# Patient Record
Sex: FEMALE | Race: WHITE | NOT HISPANIC OR LATINO | URBAN - METROPOLITAN AREA
[De-identification: names, ages, dates, MRNs, and addresses within clinical notes are randomized per-mention and may not be internally consistent; named-entity substitution may affect disease eponyms.]

---

## 2019-10-15 ENCOUNTER — OUTPATIENT (OUTPATIENT)
Dept: OUTPATIENT SERVICES | Facility: HOSPITAL | Age: 44
LOS: 1 days | End: 2019-10-15
Payer: COMMERCIAL

## 2019-10-15 VITALS
HEART RATE: 59 BPM | DIASTOLIC BLOOD PRESSURE: 74 MMHG | RESPIRATION RATE: 16 BRPM | SYSTOLIC BLOOD PRESSURE: 108 MMHG | WEIGHT: 136.69 LBS | TEMPERATURE: 98 F | HEIGHT: 61 IN

## 2019-10-15 DIAGNOSIS — D25.2 SUBSEROSAL LEIOMYOMA OF UTERUS: ICD-10-CM

## 2019-10-15 DIAGNOSIS — Z98.891 HISTORY OF UTERINE SCAR FROM PREVIOUS SURGERY: Chronic | ICD-10-CM

## 2019-10-15 DIAGNOSIS — Z98.890 OTHER SPECIFIED POSTPROCEDURAL STATES: Chronic | ICD-10-CM

## 2019-10-15 DIAGNOSIS — Z29.8 ENCOUNTER FOR OTHER SPECIFIED PROPHYLACTIC MEASURES: ICD-10-CM

## 2019-10-15 DIAGNOSIS — Z01.818 ENCOUNTER FOR OTHER PREPROCEDURAL EXAMINATION: ICD-10-CM

## 2019-10-15 LAB
ANION GAP SERPL CALC-SCNC: 13 MMOL/L — SIGNIFICANT CHANGE UP (ref 5–17)
APPEARANCE UR: CLEAR — SIGNIFICANT CHANGE UP
BASOPHILS # BLD AUTO: 0.07 K/UL — SIGNIFICANT CHANGE UP (ref 0–0.2)
BASOPHILS NFR BLD AUTO: 1.3 % — SIGNIFICANT CHANGE UP (ref 0–2)
BILIRUB UR-MCNC: NEGATIVE — SIGNIFICANT CHANGE UP
BLD GP AB SCN SERPL QL: SIGNIFICANT CHANGE UP
BUN SERPL-MCNC: 8 MG/DL — SIGNIFICANT CHANGE UP (ref 8–20)
CALCIUM SERPL-MCNC: 8.4 MG/DL — LOW (ref 8.6–10.2)
CHLORIDE SERPL-SCNC: 104 MMOL/L — SIGNIFICANT CHANGE UP (ref 98–107)
CO2 SERPL-SCNC: 21 MMOL/L — LOW (ref 22–29)
COLOR SPEC: YELLOW — SIGNIFICANT CHANGE UP
COMMENT - BLOOD BANK: SIGNIFICANT CHANGE UP
CREAT SERPL-MCNC: 0.5 MG/DL — SIGNIFICANT CHANGE UP (ref 0.5–1.3)
DIFF PNL FLD: NEGATIVE — SIGNIFICANT CHANGE UP
EOSINOPHIL # BLD AUTO: 0.15 K/UL — SIGNIFICANT CHANGE UP (ref 0–0.5)
EOSINOPHIL NFR BLD AUTO: 2.8 % — SIGNIFICANT CHANGE UP (ref 0–6)
GLUCOSE SERPL-MCNC: 86 MG/DL — SIGNIFICANT CHANGE UP (ref 70–115)
GLUCOSE UR QL: NEGATIVE MG/DL — SIGNIFICANT CHANGE UP
HBA1C BLD-MCNC: 5 % — SIGNIFICANT CHANGE UP (ref 4–5.6)
HCT VFR BLD CALC: 35.5 % — SIGNIFICANT CHANGE UP (ref 34.5–45)
HGB BLD-MCNC: 10.8 G/DL — LOW (ref 11.5–15.5)
IMM GRANULOCYTES NFR BLD AUTO: 0.4 % — SIGNIFICANT CHANGE UP (ref 0–1.5)
KETONES UR-MCNC: NEGATIVE — SIGNIFICANT CHANGE UP
LEUKOCYTE ESTERASE UR-ACNC: NEGATIVE — SIGNIFICANT CHANGE UP
LYMPHOCYTES # BLD AUTO: 1.03 K/UL — SIGNIFICANT CHANGE UP (ref 1–3.3)
LYMPHOCYTES # BLD AUTO: 19 % — SIGNIFICANT CHANGE UP (ref 13–44)
MCHC RBC-ENTMCNC: 24.9 PG — LOW (ref 27–34)
MCHC RBC-ENTMCNC: 30.4 GM/DL — LOW (ref 32–36)
MCV RBC AUTO: 81.8 FL — SIGNIFICANT CHANGE UP (ref 80–100)
MONOCYTES # BLD AUTO: 0.29 K/UL — SIGNIFICANT CHANGE UP (ref 0–0.9)
MONOCYTES NFR BLD AUTO: 5.3 % — SIGNIFICANT CHANGE UP (ref 2–14)
NEUTROPHILS # BLD AUTO: 3.87 K/UL — SIGNIFICANT CHANGE UP (ref 1.8–7.4)
NEUTROPHILS NFR BLD AUTO: 71.2 % — SIGNIFICANT CHANGE UP (ref 43–77)
NITRITE UR-MCNC: NEGATIVE — SIGNIFICANT CHANGE UP
PH UR: 6 — SIGNIFICANT CHANGE UP (ref 5–8)
PLATELET # BLD AUTO: 333 K/UL — SIGNIFICANT CHANGE UP (ref 150–400)
POTASSIUM SERPL-MCNC: 3.6 MMOL/L — SIGNIFICANT CHANGE UP (ref 3.5–5.3)
POTASSIUM SERPL-SCNC: 3.6 MMOL/L — SIGNIFICANT CHANGE UP (ref 3.5–5.3)
PROT UR-MCNC: NEGATIVE MG/DL — SIGNIFICANT CHANGE UP
RBC # BLD: 4.34 M/UL — SIGNIFICANT CHANGE UP (ref 3.8–5.2)
RBC # FLD: 16 % — HIGH (ref 10.3–14.5)
SODIUM SERPL-SCNC: 138 MMOL/L — SIGNIFICANT CHANGE UP (ref 135–145)
SP GR SPEC: 1.01 — SIGNIFICANT CHANGE UP (ref 1.01–1.02)
UROBILINOGEN FLD QL: NEGATIVE MG/DL — SIGNIFICANT CHANGE UP
WBC # BLD: 5.43 K/UL — SIGNIFICANT CHANGE UP (ref 3.8–10.5)
WBC # FLD AUTO: 5.43 K/UL — SIGNIFICANT CHANGE UP (ref 3.8–10.5)

## 2019-10-15 PROCEDURE — G0463: CPT

## 2019-10-15 NOTE — PATIENT PROFILE ADULT - NSPROPASSIVESMOKEEXPOSURE_GEN_A_NUR
_____________________________________________________  CHIEF COMPLAINT:  Chief Complaint   Patient presents with    Left Hip - Pain    Right Hip - Pain         SUBJECTIVE:  Kyler Carrillo is a 60y o  year old female who presents bilateral hip pain  Patient states the pain has been going on for some time but has noticed that it has gotten progressively worse over last few days  She denies any new injuries, falls, trauma to the area  She states it hurts to lay on her right side as well as her left side  The right hip pain is greater than the left  hip pain  She has taken some anti-inflammatories which only helps a little bit  Her pain is over aspect of both of her hips  She states the pain is constant at this point  She denies any numbness or tingling  She denies any constitutional signs or symptoms      PAST MEDICAL HISTORY:  Past Medical History:   Diagnosis Date    Anxiety     Cancer of skin of abdomen     Cardiomyopathy ([address] )     Carpal tunnel syndrome     Cervical herniated disc     Chronic migraine without aura without status migrainosus, not intractable     Chronic sinusitis     Compression fracture of lumbar spine, non-traumatic (HCC)     COPD (chronic obstructive pulmonary disease) (HCC)     Depression     Fibroids     GERD (gastroesophageal reflux disease)     Hepatitis C antibody test positive     No detectable HCV RNA    Hypercholesteremia     Hypertension     Liver hemangioma     Lumbar disc herniation     Nasal turbinate hypertrophy     Rheumatoid arthritis (HCC)     RLS (restless legs syndrome)     Tension headache        PAST SURGICAL HISTORY:  Past Surgical History:   Procedure Laterality Date    CHOLECYSTECTOMY      1/22/2016    COLONOSCOPY  11/22/2011    Dr Jane Sawyer Left 05/28/2014    FOOT SURGERY Right 09/17/2013    2nd toe    FOOT SURGERY Bilateral 11/08/2017    HAND SURGERY  05/23/2016    Right index finger and knuckle repaired    KNEE SURGERY Right 07/10/2012    LARYNGOSCOPY  05/15/2012    direct laryngoscopy with stripping of vocal cords    LARYNGOSCOPY  04/17/2012    direct laryngoscopy with stripping of vocal cords    MOHS SURGERY  02/27/2015    Trunk/Abdomen    NASAL SEPTUM SURGERY  03/05/2013    NEUROPLASTY / TRANSPOSITION MEDIAN NERVE AT CARPAL TUNNEL Right 03/09/2015    ROTATOR CUFF REPAIR Left 10/23/2012    SHOULDER ARTHROPLASTY      total shoulder replacement-right-9/22/2016    SINUS SURGERY  05/16/2017    Dr Mehdi Arment[facility] at [facility]       FAMILY HISTORY:  Family History   Problem Relation Age of Onset    Heart disease Mother         cardiac pacemaker    Lymphoma Father     Breast cancer Sister     Stroke Maternal Grandfather     Cancer Family     Other Family         bone issues       SOCIAL HISTORY:  Social History     Tobacco Use    Smoking status: Current Every Day Smoker     Packs/day: 0 50     Years: 26 00     Pack years: 13 00     Types: Cigarettes     Start date: 1992    Smokeless tobacco: Never Used   Substance Use Topics    Alcohol use: Yes     Frequency: 2-4 times a month     Comment: social    Drug use: No       MEDICATIONS:    Current Outpatient Medications:     Adalimumab (HUMIRA PEN) 40 MG/0 8ML PNKT, Inject under the skin, Disp: , Rfl:     albuterol (VENTOLIN HFA) 90 mcg/act inhaler, Inhale 2 puffs 2 (two) times a day  , Disp: , Rfl:     alendronate (FOSAMAX) 70 mg tablet, Take 70 mg by mouth Once a week, Disp: , Rfl:     aspirin 81 MG tablet, Take 1 tablet by mouth daily, Disp: , Rfl:     atorvastatin (LIPITOR) 40 mg tablet, Take 1 tablet by mouth, Disp: , Rfl:     bethanechol (URECHOLINE) 25 mg tablet, Take 1 tablet by mouth 2 (two) times a day  , Disp: , Rfl:     buPROPion (WELLBUTRIN XL) 150 mg 24 hr tablet, TAKE 1 TABLET BY MOUTH EVERY DAY, Disp: 30 tablet, Rfl: 3    butalbital-acetaminophen-caffeine (FIORICET,ESGIC) -40 mg per tablet, Take 1 tablet by mouth 2 (two) times a day as needed for migraine, Disp: 40 tablet, Rfl: 1    Calcium Carb-Cholecalciferol (CALCIUM 600+D3) 600-200 MG-UNIT TABS, Take 2 tablets by mouth daily, Disp: , Rfl:     clonazePAM (KlonoPIN) 0 5 mg tablet, Take 1 tablet by mouth as needed  , Disp: , Rfl:     DULoxetine (CYMBALTA) 30 mg delayed release capsule, Take 30 mg by mouth daily, Disp: , Rfl:     DULoxetine (CYMBALTA) 60 mg delayed release capsule, Take 60 mg by mouth daily  , Disp: , Rfl:     ergocalciferol (VITAMIN D2) 50,000 units, TAKE ONE CAPSULE BY MOUTH ONE TIME PER WEEK, Disp: , Rfl:     eszopiclone (LUNESTA) 2 mg tablet, TAKE 1 TABLET (2 MG) BY MOUTH DAILY IMMEDIATELY BEFORE BEDTIME, Disp: , Rfl: 0    fluticasone (FLONASE) 50 mcg/act nasal spray, 1 spray into each nostril daily, Disp: 16 g, Rfl: 3    Lactobacillus (PROBIOTIC ACIDOPHILUS PO), Take 1 capsule by mouth daily, Disp: , Rfl:     lidocaine (XYLOCAINE) 5 % ointment, apply locally DAILY prn, Disp: , Rfl: 2    lisinopril (ZESTRIL) 40 mg tablet, Take 1 tablet by mouth daily, Disp: , Rfl:     meclizine (ANTIVERT) 25 mg tablet, TAKE 1 TABLET (25 MG TOTAL) BY MOUTH EVERY 12 (TWELVE) HOURS AS NEEDED FOR DIZZINESS, Disp: 60 tablet, Rfl: 3    metroNIDAZOLE (METROGEL) 1 % gel, Apply topically as needed  , Disp: , Rfl:     mometasone (NASONEX) 50 mcg/act nasal spray, into each nostril, Disp: , Rfl:     orphenadrine (NORFLEX) 100 mg tablet, TAKE 1 TABLET (100 MG TOTAL) BY MOUTH DAILY AT BEDTIME FOR 30 DAYS, Disp: 30 tablet, Rfl: 1    pantoprazole (PROTONIX) 40 mg tablet, Take 1 tablet by mouth 2 (two) times a day  , Disp: , Rfl:     primidone (MYSOLINE) 50 mg tablet, TAKE 1 TABLET BY MOUTH TWICE A DAY, Disp: 180 tablet, Rfl: 1    topiramate (TOPAMAX) 50 MG tablet, Take 1 tablet (50 mg total) by mouth daily at bedtime, Disp: 30 tablet, Rfl: 6    BREO ELLIPTA 200-25 MCG/INH inhaler, , Disp: , Rfl:     meloxicam (MOBIC) 15 mg tablet, , Disp: , Rfl:     ALLERGIES:  Allergies   Allergen Reactions    Clindamycin Rash    Pollen Extract Itching and Sneezing       REVIEW OF SYSTEMS:  General: no fever, no chills  HEENT:  No loss of hearing or eyesight problems  Eyes:  No red eyes  Respiratory:  No coughing, shortness of breath or wheezing  Cardiovascular:  No chest pain, no palpitations  GI:  Abdomen soft nontender, denies nausea  Endocrine:  No muscle weakness, no frequent urination, no excessive thirst  Urinary:  No dysuria, no incontinence  Musculoskeletal: see HPI and PE  SKIN:  No skin rash, no dry skin  Neurological:  No headaches, no confusion  Psychiatric:  No suicide thoughts, no anxiety, no depression  Review of all other systems is negative    LABS:  HgA1c: No results found for: HGBA1C  BMP: No results found for: GLUCOSE, CALCIUM, NA, K, CO2, CL, BUN, CREATININE    _____________________________________________________  PHYSICAL EXAMINATION:  Vitals:    03/06/19 1044   BP: 120/76   Pulse: 75   Resp: 18       General: well developed and well nourished, alert, oriented times 3 and appears comfortable  Psychiatric: Normal  HEENT: Trachea Midline, No torticollis  Cardiovascular: No discernable arrhythmia  Pulmonary: No wheezing or stridor  Skin: No masses, erthema, lacerations, fluctation, ulcerations  Neurovascular:  L1-S1 motor and sensory intact, pulses were compared bilaterally and were equal, capillary refill less than 3 seconds  MUSCULOSKELETAL EXAMINATION:  Right hip:  No erythema edema or ecchymosis noted  Skin is warm to touch  Tenderness to palpation over the greater trochanter  Hip range of motion was within normal limits  Strength is 5/5 in all directions  Negative log roll test, negative Stinchfield test, negative straight leg raise  Patient is neurovascularly intact  Left hip: No erythema edema or ecchymosis noted  Skin is warm to touch  Tenderness to palpation over the greater trochanter  Hip range of motion was within normal limits  Strength is 5/5 in all directions    Negative log roll test, negative Stinchfield test, negative straight leg raise  Patient is neurovascularly intact     _____________________________________________________  STUDIES REVIEWED:  I personally reviewed the x-rays from 03/06/2019  X-rays demonstrated no acute fractures or dislocations  Joint space is well preserved bilateral hips  Small osteophyte appreciated over bilateral greater trochanters  ASSESSMENT/PLAN:    Diagnoses and all orders for this visit:    Greater trochanteric bursitis of right hip    Pain of both hip joints  -     Cancel: XR hips bilateral 3-4 vw w pelvis if performed; Future    Other orders  -     ergocalciferol (VITAMIN D2) 50,000 units; TAKE ONE CAPSULE BY MOUTH ONE TIME PER WEEK  -     BREO ELLIPTA 200-25 MCG/INH inhaler  -     meloxicam (MOBIC) 15 mg tablet        Assessment:   Greater trochanteric bursitis right hip    Plan:   Patient was given a cortisone injection today into the right greater trochanter  She tolerated it well  She can continue to take anti-inflammatories as needed for pain  She will follow up in 6 weeks with Dr Husam Lira to see how the injections work  If at that time she continues to have some discomfort we can start her on a course of physical therapy  She has difficulty getting to physical therapy currently due to transportation      Follow Up:  Six weeks    PROCEDURES PERFORMED:  Large joint arthrocentesis: R greater trochanteric bursa  Date/Time: 3/6/2019 11:25 AM  Consent given by: patient  Site marked: site marked  Timeout: Immediately prior to procedure a time out was called to verify the correct patient, procedure, equipment, support staff and site/side marked as required   Supporting Documentation  Indications: pain   Procedure Details  Location: hip - R greater trochanteric bursa  Needle size: 22 G  Ultrasound guidance: no  Approach: lateral  Medications administered: 2 mL bupivacaine (PF) 0 75 %; 2 mL lidocaine 1 %; 1 mL methylPREDNISolone acetate 40 mg/mL    Patient tolerance: patient tolerated the procedure well with no immediate complications  Dressing:  Sterile dressing applied            Scribe Attestation    I,:    am acting as a scribe while in the presence of the attending physician :        I,:    personally performed the services described in this documentation    as scribed in my presence : Unknown

## 2019-10-15 NOTE — H&P PST ADULT - ATTENDING COMMENTS
patient with h/o menorraghia status post 2 previous blood transfusions due to fibroid uterus  patient desires definitive management  risks, benefits and alternatives of abdominal hysterectomy explained  will proceed with planned surgery  surgical site marked and consent signed

## 2019-10-15 NOTE — H&P PST ADULT - NEGATIVE GASTROINTESTINAL SYMPTOMS
no flatulence/no diarrhea/no abdominal pain/no hematochezia/no hiccoughs/no nausea/no melena/no jaundice/no change in bowel habits/no steatorrhea/no vomiting

## 2019-10-15 NOTE — H&P PST ADULT - NSICDXPROBLEM_GEN_ALL_CORE_FT
PROBLEM DIAGNOSES  Problem: Need for malaria prophylaxis  Assessment and Plan: moderate risk for VTE event, the surgical team will evaluate for appropriate VTE prophylaxis    Problem: Leiomyoma, subserous  Assessment and Plan:  total abdominal hysterectomy bilateral salpingectomy.

## 2019-10-15 NOTE — H&P PST ADULT - HISTORY OF PRESENT ILLNESS
44 year old female  present with menorrhagia. Patient state her periods last about 10 days and are very heavy and passes large colts.  She state work up reveled a large fibroid.  She is now schedule for total abdominal hysterectomy bilateral salpingectomy.

## 2019-10-15 NOTE — H&P PST ADULT - ASSESSMENT
44 year old female  present with menorrhagia. Patient state her periods last about 10 days and are very heavy and passes large colts.  She state work up reveled a large fibroid.  She is now schedule for total abdominal hysterectomy bilateral salpingectomy.    CAPRINI VTE 2.0 SCORE [CLOT updated 2019]    AGE RELATED RISK FACTORS                                                       MOBILITY RELATED FACTORS  [ x] Age 41-60 years                                            (1 Point)                    [ ] Bed rest                                                        (1 Point)  [ ] Age: 61-74 years                                           (2 Points)                  [ ] Plaster cast                                                   (2 Points)  [ ] Age= 75 years                                              (3 Points)                    [ ] Bed bound for more than 72 hours                 (2 Points)    DISEASE RELATED RISK FACTORS                                               GENDER SPECIFIC FACTORS  [ ] Edema in the lower extremities                       (1 Point)              [ ] Pregnancy                                                     (1 Point)  [ ] Varicose veins                                               (1 Point)                     [ ] Post-partum < 6 weeks                                   (1 Point)             [x ] BMI > 25 Kg/m2                                            (1 Point)                     [ ] Hormonal therapy  or oral contraception          (1 Point)                 [ ] Sepsis (in the previous month)                        (1 Point)               [ ] History of pregnancy complications                 (1 point)  [ ] Pneumonia or serious lung disease                                               [ ] Unexplained or recurrent                     (1 Point)           (in the previous month)                               (1 Point)  [ ] Abnormal pulmonary function test                     (1 Point)                 SURGERY RELATED RISK FACTORS  [ ] Acute myocardial infarction                              (1 Point)               [ ]  Section                                             (1 Point)  [ ] Congestive heart failure (in the previous month)  (1 Point)      [ ] Minor surgery                                                  (1 Point)   [ ] Inflammatory bowel disease                             (1 Point)               [ ] Arthroscopic surgery                                        (2 Points)  [ ] Central venous access                                      (2 Points)                [ x] General surgery lasting more than 45 minutes (2 points)  [ ] Malignancy- Present or previous                   (2 Points)                [ ] Elective arthroplasty                                         (5 points)    [ ] Stroke (in the previous month)                          (5 Points)                                                                                                                                                           HEMATOLOGY RELATED FACTORS                                                 TRAUMA RELATED RISK FACTORS  [ ] Prior episodes of VTE                                     (3 Points)                [ ] Fracture of the hip, pelvis, or leg                       (5 Points)  [ ] Positive family history for VTE                         (3 Points)             [ ] Acute spinal cord injury (in the previous month)  (5 Points)  [ ] Prothrombin 60834 A                                     (3 Points)               [ ] Paralysis  (less than 1 month)                             (5 Points)  [ ] Factor V Leiden                                             (3 Points)                  [ ] Multiple Trauma within 1 month                        (5 Points)  [ ] Lupus anticoagulants                                     (3 Points)                                                           [ ] Anticardiolipin antibodies                               (3 Points)                                                       [ ] High homocysteine in the blood                      (3 Points)                                             [ ] Other congenital or acquired thrombophilia      (3 Points)                                                [ ] Heparin induced thrombocytopenia                  (3 Points)                                     Total Score [ 4   ]

## 2019-10-17 RX ORDER — SODIUM CHLORIDE 9 MG/ML
3 INJECTION INTRAMUSCULAR; INTRAVENOUS; SUBCUTANEOUS EVERY 8 HOURS
Refills: 0 | Status: DISCONTINUED | OUTPATIENT
Start: 2019-10-24 | End: 2019-10-24

## 2019-10-24 ENCOUNTER — INPATIENT (INPATIENT)
Facility: HOSPITAL | Age: 44
LOS: 1 days | Discharge: HOME | DRG: 742 | End: 2019-10-26
Attending: OBSTETRICS & GYNECOLOGY | Admitting: OBSTETRICS & GYNECOLOGY
Payer: COMMERCIAL

## 2019-10-24 VITALS
RESPIRATION RATE: 16 BRPM | WEIGHT: 138.01 LBS | DIASTOLIC BLOOD PRESSURE: 60 MMHG | HEIGHT: 61 IN | SYSTOLIC BLOOD PRESSURE: 100 MMHG | OXYGEN SATURATION: 99 % | HEART RATE: 94 BPM | TEMPERATURE: 97 F

## 2019-10-24 DIAGNOSIS — D25.2 SUBSEROSAL LEIOMYOMA OF UTERUS: ICD-10-CM

## 2019-10-24 DIAGNOSIS — Z98.891 HISTORY OF UTERINE SCAR FROM PREVIOUS SURGERY: Chronic | ICD-10-CM

## 2019-10-24 DIAGNOSIS — Z98.890 OTHER SPECIFIED POSTPROCEDURAL STATES: Chronic | ICD-10-CM

## 2019-10-24 LAB
BLD GP AB SCN SERPL QL: SIGNIFICANT CHANGE UP
GLUCOSE BLDC GLUCOMTR-MCNC: 110 MG/DL — HIGH (ref 70–99)
GLUCOSE BLDC GLUCOMTR-MCNC: 151 MG/DL — HIGH (ref 70–99)
GLUCOSE BLDC GLUCOMTR-MCNC: 91 MG/DL — SIGNIFICANT CHANGE UP (ref 70–99)

## 2019-10-24 PROCEDURE — 88300 SURGICAL PATH GROSS: CPT | Mod: 26,59

## 2019-10-24 PROCEDURE — 88307 TISSUE EXAM BY PATHOLOGIST: CPT | Mod: 26

## 2019-10-24 RX ORDER — HYDROMORPHONE HYDROCHLORIDE 2 MG/ML
30 INJECTION INTRAMUSCULAR; INTRAVENOUS; SUBCUTANEOUS
Refills: 0 | Status: DISCONTINUED | OUTPATIENT
Start: 2019-10-24 | End: 2019-10-25

## 2019-10-24 RX ORDER — CEFOTETAN DISODIUM 1 G
2 VIAL (EA) INJECTION ONCE
Refills: 0 | Status: COMPLETED | OUTPATIENT
Start: 2019-10-24 | End: 2019-10-24

## 2019-10-24 RX ORDER — ONDANSETRON 8 MG/1
4 TABLET, FILM COATED ORAL ONCE
Refills: 0 | Status: COMPLETED | OUTPATIENT
Start: 2019-10-24 | End: 2019-10-24

## 2019-10-24 RX ORDER — ONDANSETRON 8 MG/1
4 TABLET, FILM COATED ORAL ONCE
Refills: 0 | Status: DISCONTINUED | OUTPATIENT
Start: 2019-10-24 | End: 2019-10-26

## 2019-10-24 RX ORDER — FENTANYL CITRATE 50 UG/ML
50 INJECTION INTRAVENOUS
Refills: 0 | Status: DISCONTINUED | OUTPATIENT
Start: 2019-10-24 | End: 2019-10-24

## 2019-10-24 RX ORDER — SODIUM CHLORIDE 9 MG/ML
1000 INJECTION, SOLUTION INTRAVENOUS
Refills: 0 | Status: DISCONTINUED | OUTPATIENT
Start: 2019-10-24 | End: 2019-10-24

## 2019-10-24 RX ADMIN — FENTANYL CITRATE 50 MICROGRAM(S): 50 INJECTION INTRAVENOUS at 18:18

## 2019-10-24 RX ADMIN — FENTANYL CITRATE 50 MICROGRAM(S): 50 INJECTION INTRAVENOUS at 18:08

## 2019-10-24 RX ADMIN — ONDANSETRON 4 MILLIGRAM(S): 8 TABLET, FILM COATED ORAL at 18:08

## 2019-10-24 RX ADMIN — FENTANYL CITRATE 50 MICROGRAM(S): 50 INJECTION INTRAVENOUS at 18:23

## 2019-10-24 RX ADMIN — HYDROMORPHONE HYDROCHLORIDE 30 MILLILITER(S): 2 INJECTION INTRAMUSCULAR; INTRAVENOUS; SUBCUTANEOUS at 18:11

## 2019-10-24 RX ADMIN — Medication 100 GRAM(S): at 16:03

## 2019-10-24 RX ADMIN — HYDROMORPHONE HYDROCHLORIDE 30 MILLILITER(S): 2 INJECTION INTRAMUSCULAR; INTRAVENOUS; SUBCUTANEOUS at 21:43

## 2019-10-24 RX ADMIN — HYDROMORPHONE HYDROCHLORIDE 30 MILLILITER(S): 2 INJECTION INTRAMUSCULAR; INTRAVENOUS; SUBCUTANEOUS at 19:18

## 2019-10-24 RX ADMIN — FENTANYL CITRATE 50 MICROGRAM(S): 50 INJECTION INTRAVENOUS at 19:30

## 2019-10-24 RX ADMIN — SODIUM CHLORIDE 100 MILLILITER(S): 9 INJECTION, SOLUTION INTRAVENOUS at 18:09

## 2019-10-24 NOTE — BRIEF OPERATIVE NOTE - NSICDXBRIEFPROCEDURE_GEN_ALL_CORE_FT
PROCEDURES:  Total abdominal hysterectomy with bilateral salpingectomy 24-Oct-2019 17:31:42  Lilly Kemp

## 2019-10-25 DIAGNOSIS — D64.9 ANEMIA, UNSPECIFIED: ICD-10-CM

## 2019-10-25 DIAGNOSIS — Z90.710 ACQUIRED ABSENCE OF BOTH CERVIX AND UTERUS: ICD-10-CM

## 2019-10-25 LAB
BASOPHILS # BLD AUTO: 0.01 K/UL — SIGNIFICANT CHANGE UP (ref 0–0.2)
BASOPHILS NFR BLD AUTO: 0.1 % — SIGNIFICANT CHANGE UP (ref 0–2)
EOSINOPHIL # BLD AUTO: 0 K/UL — SIGNIFICANT CHANGE UP (ref 0–0.5)
EOSINOPHIL NFR BLD AUTO: 0 % — SIGNIFICANT CHANGE UP (ref 0–6)
HCT VFR BLD CALC: 15.7 % — CRITICAL LOW (ref 34.5–45)
HCT VFR BLD CALC: 17 % — CRITICAL LOW (ref 34.5–45)
HCT VFR BLD CALC: 24.8 % — LOW (ref 34.5–45)
HGB BLD-MCNC: 4.7 G/DL — CRITICAL LOW (ref 11.5–15.5)
HGB BLD-MCNC: 5 G/DL — CRITICAL LOW (ref 11.5–15.5)
HGB BLD-MCNC: 8 G/DL — LOW (ref 11.5–15.5)
IMM GRANULOCYTES NFR BLD AUTO: 0.6 % — SIGNIFICANT CHANGE UP (ref 0–1.5)
LYMPHOCYTES # BLD AUTO: 0.78 K/UL — LOW (ref 1–3.3)
LYMPHOCYTES # BLD AUTO: 8.4 % — LOW (ref 13–44)
MCHC RBC-ENTMCNC: 23.4 PG — LOW (ref 27–34)
MCHC RBC-ENTMCNC: 23.9 PG — LOW (ref 27–34)
MCHC RBC-ENTMCNC: 25.8 PG — LOW (ref 27–34)
MCHC RBC-ENTMCNC: 29.4 GM/DL — LOW (ref 32–36)
MCHC RBC-ENTMCNC: 29.9 GM/DL — LOW (ref 32–36)
MCHC RBC-ENTMCNC: 32.3 GM/DL — SIGNIFICANT CHANGE UP (ref 32–36)
MCV RBC AUTO: 79.4 FL — LOW (ref 80–100)
MCV RBC AUTO: 79.7 FL — LOW (ref 80–100)
MCV RBC AUTO: 80 FL — SIGNIFICANT CHANGE UP (ref 80–100)
MONOCYTES # BLD AUTO: 0.7 K/UL — SIGNIFICANT CHANGE UP (ref 0–0.9)
MONOCYTES NFR BLD AUTO: 7.6 % — SIGNIFICANT CHANGE UP (ref 2–14)
NEUTROPHILS # BLD AUTO: 7.71 K/UL — HIGH (ref 1.8–7.4)
NEUTROPHILS NFR BLD AUTO: 83.3 % — HIGH (ref 43–77)
PLATELET # BLD AUTO: 167 K/UL — SIGNIFICANT CHANGE UP (ref 150–400)
PLATELET # BLD AUTO: 181 K/UL — SIGNIFICANT CHANGE UP (ref 150–400)
PLATELET # BLD AUTO: 223 K/UL — SIGNIFICANT CHANGE UP (ref 150–400)
RBC # BLD: 1.97 M/UL — LOW (ref 3.8–5.2)
RBC # BLD: 2.14 M/UL — LOW (ref 3.8–5.2)
RBC # BLD: 3.1 M/UL — LOW (ref 3.8–5.2)
RBC # FLD: 15.9 % — HIGH (ref 10.3–14.5)
RBC # FLD: 16.3 % — HIGH (ref 10.3–14.5)
RBC # FLD: 16.4 % — HIGH (ref 10.3–14.5)
WBC # BLD: 10.02 K/UL — SIGNIFICANT CHANGE UP (ref 3.8–10.5)
WBC # BLD: 8.9 K/UL — SIGNIFICANT CHANGE UP (ref 3.8–10.5)
WBC # BLD: 9.26 K/UL — SIGNIFICANT CHANGE UP (ref 3.8–10.5)
WBC # FLD AUTO: 10.02 K/UL — SIGNIFICANT CHANGE UP (ref 3.8–10.5)
WBC # FLD AUTO: 8.9 K/UL — SIGNIFICANT CHANGE UP (ref 3.8–10.5)
WBC # FLD AUTO: 9.26 K/UL — SIGNIFICANT CHANGE UP (ref 3.8–10.5)

## 2019-10-25 RX ORDER — SODIUM CHLORIDE 9 MG/ML
1000 INJECTION, SOLUTION INTRAVENOUS ONCE
Refills: 0 | Status: COMPLETED | OUTPATIENT
Start: 2019-10-25 | End: 2019-10-25

## 2019-10-25 RX ORDER — OXYCODONE AND ACETAMINOPHEN 5; 325 MG/1; MG/1
2 TABLET ORAL EVERY 6 HOURS
Refills: 0 | Status: DISCONTINUED | OUTPATIENT
Start: 2019-10-25 | End: 2019-10-26

## 2019-10-25 RX ORDER — IBUPROFEN 200 MG
800 TABLET ORAL EVERY 6 HOURS
Refills: 0 | Status: DISCONTINUED | OUTPATIENT
Start: 2019-10-25 | End: 2019-10-26

## 2019-10-25 RX ADMIN — Medication 800 MILLIGRAM(S): at 18:15

## 2019-10-25 RX ADMIN — OXYCODONE AND ACETAMINOPHEN 2 TABLET(S): 5; 325 TABLET ORAL at 21:58

## 2019-10-25 RX ADMIN — Medication 800 MILLIGRAM(S): at 17:39

## 2019-10-25 RX ADMIN — Medication 800 MILLIGRAM(S): at 10:10

## 2019-10-25 RX ADMIN — Medication 800 MILLIGRAM(S): at 09:34

## 2019-10-25 RX ADMIN — SODIUM CHLORIDE 1000 MILLILITER(S): 9 INJECTION, SOLUTION INTRAVENOUS at 08:20

## 2019-10-25 RX ADMIN — OXYCODONE AND ACETAMINOPHEN 2 TABLET(S): 5; 325 TABLET ORAL at 22:28

## 2019-10-25 NOTE — PROGRESS NOTE ADULT - ASSESSMENT
ASSESSMENT  FABIOLA PHILLIPS is a 44y on post-op day 1 s/p LARRY BS for fibroid uterus and AUB.   No acute events overnight.     PLAN  1. Routine post-op care  - Continue to encourage ambulation, PO intake and incentive spirometry  - Plan for jorgensen out this morning  - DVT prophylaxis: SCD's in place/ Patient ambulating intermittently.   - Continue pain management PRN  - Plan to discharge post-op day 3  - Dressing removed; steri-strips remain clean and intact.     Whitney Hooker- MS4 ASSESSMENT  FABIOLA PHILLIPS is a 44y on post-op day 1 s/p LARRY BS for fibroid uterus and AUB.   No acute events overnight.     PLAN  1. Routine post-op care  - Continue to encourage ambulation, PO intake and incentive spirometry  - Plan for jorgensen out this morning  - DVT prophylaxis: SCD's in place/ Patient ambulating intermittently.   - transition to PO pain medication today, discontinue PCA  - Plan to discharge post-op day 3  - Dressing removed; steri-strips remain clean and intact.     Whitney Hooker- MS4

## 2019-10-25 NOTE — PROGRESS NOTE ADULT - SUBJECTIVE AND OBJECTIVE BOX
[facility] Post-OP Progress Note    FABIOLA PHILLIPS is a 44y on post-op day 1 s/p LARRY BS for fibroid uterus and AUB. Immediate post-op course was uncomplicated.     SUBJECTIVE:  No acute events overnight. Pain is well controlled with PCA pump. Patient has no problems with ambulation, catheter is still in place, and patient has appetite and desires solid food. Has not passed flatus or had a BM. Patient denies nausea, vomiting, headache, or dizziness.     OBJECTIVE:  Physical exam:  Vital Signs Last 24 Hrs  T(C): 37.1 (25 Oct 2019 04:15), Max: 37.2 (24 Oct 2019 17:56)  T(F): 98.7 (25 Oct 2019 04:15), Max: 98.9 (24 Oct 2019 17:56)  HR: 97 (25 Oct 2019 04:15) (71 - 97)  BP: 94/62 (25 Oct 2019 04:15) (94/62 - 127/78)  RR: 18 (25 Oct 2019 04:15) (10 - 18)  SpO2: 96% (25 Oct 2019 04:15) (95% - 100%)  General: alert and oriented x3, no acute distress.  Heart: regular rate and rhythm, no murmurs, rubs or gallops appreciated.  Lungs: clear to auscultation bilaterally.   Abdomen: Soft, appropriately tender to palpitation. Incision appears dry and intact. Steri strips in place.   Extremities: no tenderness, redness or swelling in lower extremities bilaterally.     Labs:   AM labs pending

## 2019-10-25 NOTE — MEDICAL STUDENT PROGRESS NOTE(EDUCATION) - SUBJECTIVE AND OBJECTIVE BOX
[facility] Post-OP Progress Note    FABIOLA PHILLIPS is a 44y on post-op day 1 s/p LARRY BS for fibroid uterus and AUB.     SUBJECTIVE:  No acute events overnight. Pain is well controlled with PRN pain medication. Patient has no problems with ambulation, catheter is still in place, and patient has appetite and desires solid food. Has not passed flatus or had a BM. Patient denies nausea, vomiting, headache, or dizziness.     OBJECTIVE:  Physical exam:  Vital Signs Last 24 Hrs  T(C): 37.1 (25 Oct 2019 04:15), Max: 37.2 (24 Oct 2019 17:56)  T(F): 98.7 (25 Oct 2019 04:15), Max: 98.9 (24 Oct 2019 17:56)  HR: 97 (25 Oct 2019 04:15) (71 - 97)  BP: 94/62 (25 Oct 2019 04:15) (94/62 - 127/78)  BP(mean): --  RR: 18 (25 Oct 2019 04:15) (10 - 18)  SpO2: 96% (25 Oct 2019 04:15) (95% - 100%)  General: alert and oriented x3, no acute distress.  Heart: regular rate and rhythm, no murmurs, rubs or gallops appreciated.  Lungs: clear to auscultation bilaterally.   Abdomen: Soft, appropriately tender to palpitation. Incision appears dry and intact. Steri strips in place.   Extremities: no tenderness, redness or swelling in lower extremities bilaterally.     Labs:   AM labs pending    ASSESSMENT  FABIOLA PHILLIPS is a 44y on post-op day 1 s/p LARRY BS for fibroid uterus and AUB.   No acute events overnight.     PLAN  1. Routine post-op care  - Continue to encourage ambulation, PO intake and incentive spirometry  - Plan for jorgensen out this morning  - DVT prophylaxis: SCD's in place/ Patient ambulating intermittently.   - Continue pain management PRN  - Plan to discharge post-op day 3  - Dressing removed; steri-strips remain clean and intact.     Pending discussion with Dr. Justo Hooker- MS4

## 2019-10-25 NOTE — CHART NOTE - NSCHARTNOTEFT_GEN_A_CORE
Patient continues to feel dizziness and lightheadedness after first unit of blood. Patient received motrin for pain, stated that it has helped with her pain.    Vital Signs Last 24 Hrs  T(C): 36.9 (25 Oct 2019 09:10), Max: 37.2 (24 Oct 2019 17:56)  T(F): 98.5 (25 Oct 2019 09:10), Max: 98.9 (24 Oct 2019 17:56)  HR: 88 (25 Oct 2019 09:10) (71 - 97)  BP: 102/65 (25 Oct 2019 09:10) (92/62 - 127/78)  RR: 18 (25 Oct 2019 09:10) (10 - 18)  SpO2: 97% (25 Oct 2019 09:10) (95% - 100%)  General: Alert and oriented x3, no acute distress  Cardiovascular: regular rate and rhythm, no murmurs, rubs or gallops appreciated on exam  Respiratory: clear to auscultation bilaterally  Abdominal: appropriately tender to palpation, incision dry and intact. no distension noted.     Plan  - continue with 2nd unit of pRBCs as fast as possible  - repeat CBC 4 hours after 2nd unit of pRBCs given    d/w Dr. Reynoso

## 2019-10-25 NOTE — CHART NOTE - NSCHARTNOTEFT_GEN_A_CORE
Patient is feeling more lightheaded, dizzy, and recently had an episode of vomiting clear liquids. She states she is not bleeding heavily, but that prior to surgery she had been bleeding for a couple of days from her period. EBL from procedure was 500ml and immediate post-op course was uncomplicated. She was able to tolerate ambulation this AM and was able to void.     Vital Signs Last 24 Hrs  T(C): 36.9 (25 Oct 2019 08:03), Max: 37.2 (24 Oct 2019 17:56)  T(F): 98.5 (25 Oct 2019 08:03), Max: 98.9 (24 Oct 2019 17:56)  HR: 88 (25 Oct 2019 08:03) (71 - 97)  BP: laying 100/65, sitting 98/54, standing 115/74   RR: 18 (25 Oct 2019 08:03) (10 - 18)  SpO2: 96% (25 Oct 2019 08:03) (95% - 100%)    General: Alert and oriented x3, no acute distress  Cardiovascular: regular rate and rhythm, no murmurs, rubs or gallops appreciated on exam  Respiratory: clear to auscultation bilaterally  Abdominal: bowel sounds in all 4 quadrants, soft, appropriately tender to palpation, no distention.   Pelvic: No vaginal bleeding, incision appears dry and intact.   Extremities: no redness, swelling or tenderness to palpation in lower extremities bilaterally.                           5.0    9.26  )-----------( 181      ( 25 Oct 2019 07:06 )             17.0     prior H/H 10/15/19: 10/35    Plan  1. Anemia (hgb: 5)   - LR 1000 ml bolus  - consent and give 2 units pRBCs, administed first unit as fast as possible for symptomatic anemia.   - repeat CBC    d/w Dr. Pittman and Dr. Reynoso

## 2019-10-26 VITALS
HEART RATE: 87 BPM | RESPIRATION RATE: 18 BRPM | TEMPERATURE: 99 F | SYSTOLIC BLOOD PRESSURE: 98 MMHG | DIASTOLIC BLOOD PRESSURE: 61 MMHG

## 2019-10-26 LAB
BASOPHILS # BLD AUTO: 0.05 K/UL — SIGNIFICANT CHANGE UP (ref 0–0.2)
BASOPHILS NFR BLD AUTO: 0.8 % — SIGNIFICANT CHANGE UP (ref 0–2)
EOSINOPHIL # BLD AUTO: 0.03 K/UL — SIGNIFICANT CHANGE UP (ref 0–0.5)
EOSINOPHIL NFR BLD AUTO: 0.5 % — SIGNIFICANT CHANGE UP (ref 0–6)
HCT VFR BLD CALC: 23.6 % — LOW (ref 34.5–45)
HGB BLD-MCNC: 7.5 G/DL — LOW (ref 11.5–15.5)
IMM GRANULOCYTES NFR BLD AUTO: 1.4 % — SIGNIFICANT CHANGE UP (ref 0–1.5)
LYMPHOCYTES # BLD AUTO: 1.63 K/UL — SIGNIFICANT CHANGE UP (ref 1–3.3)
LYMPHOCYTES # BLD AUTO: 24.7 % — SIGNIFICANT CHANGE UP (ref 13–44)
MCHC RBC-ENTMCNC: 25.6 PG — LOW (ref 27–34)
MCHC RBC-ENTMCNC: 31.8 GM/DL — LOW (ref 32–36)
MCV RBC AUTO: 80.5 FL — SIGNIFICANT CHANGE UP (ref 80–100)
MONOCYTES # BLD AUTO: 0.64 K/UL — SIGNIFICANT CHANGE UP (ref 0–0.9)
MONOCYTES NFR BLD AUTO: 9.7 % — SIGNIFICANT CHANGE UP (ref 2–14)
NEUTROPHILS # BLD AUTO: 4.17 K/UL — SIGNIFICANT CHANGE UP (ref 1.8–7.4)
NEUTROPHILS NFR BLD AUTO: 62.9 % — SIGNIFICANT CHANGE UP (ref 43–77)
PLATELET # BLD AUTO: 222 K/UL — SIGNIFICANT CHANGE UP (ref 150–400)
RBC # BLD: 2.93 M/UL — LOW (ref 3.8–5.2)
RBC # FLD: 16 % — HIGH (ref 10.3–14.5)
WBC # BLD: 6.61 K/UL — SIGNIFICANT CHANGE UP (ref 3.8–10.5)
WBC # FLD AUTO: 6.61 K/UL — SIGNIFICANT CHANGE UP (ref 3.8–10.5)

## 2019-10-26 PROCEDURE — 85027 COMPLETE CBC AUTOMATED: CPT

## 2019-10-26 PROCEDURE — 36415 COLL VENOUS BLD VENIPUNCTURE: CPT

## 2019-10-26 PROCEDURE — 82962 GLUCOSE BLOOD TEST: CPT

## 2019-10-26 PROCEDURE — 88307 TISSUE EXAM BY PATHOLOGIST: CPT

## 2019-10-26 PROCEDURE — 86901 BLOOD TYPING SEROLOGIC RH(D): CPT

## 2019-10-26 PROCEDURE — 86900 BLOOD TYPING SEROLOGIC ABO: CPT

## 2019-10-26 PROCEDURE — 36430 TRANSFUSION BLD/BLD COMPNT: CPT

## 2019-10-26 PROCEDURE — T1013: CPT

## 2019-10-26 PROCEDURE — 86850 RBC ANTIBODY SCREEN: CPT

## 2019-10-26 PROCEDURE — 88300 SURGICAL PATH GROSS: CPT

## 2019-10-26 PROCEDURE — 86923 COMPATIBILITY TEST ELECTRIC: CPT

## 2019-10-26 PROCEDURE — P9016: CPT

## 2019-10-26 RX ORDER — FERROUS SULFATE 325(65) MG
325 TABLET ORAL DAILY
Refills: 0 | Status: DISCONTINUED | OUTPATIENT
Start: 2019-10-26 | End: 2019-10-26

## 2019-10-26 RX ORDER — FERROUS SULFATE 325(65) MG
1 TABLET ORAL
Qty: 0 | Refills: 0 | DISCHARGE

## 2019-10-26 RX ORDER — IBUPROFEN 200 MG
1 TABLET ORAL
Qty: 40 | Refills: 0
Start: 2019-10-26 | End: 2019-11-04

## 2019-10-26 RX ORDER — FERROUS SULFATE 325(65) MG
1 TABLET ORAL
Qty: 90 | Refills: 0
Start: 2019-10-26 | End: 2019-11-24

## 2019-10-26 RX ORDER — IRON SUCROSE 20 MG/ML
200 INJECTION, SOLUTION INTRAVENOUS ONCE
Refills: 0 | Status: COMPLETED | OUTPATIENT
Start: 2019-10-26 | End: 2019-10-26

## 2019-10-26 RX ADMIN — IRON SUCROSE 110 MILLIGRAM(S): 20 INJECTION, SOLUTION INTRAVENOUS at 12:20

## 2019-10-26 RX ADMIN — Medication 800 MILLIGRAM(S): at 06:21

## 2019-10-26 RX ADMIN — Medication 800 MILLIGRAM(S): at 05:51

## 2019-10-26 NOTE — PROGRESS NOTE ADULT - ATTENDING COMMENTS
pt s/p one dose of venofer  feels better  she is ambulating  tolerating PO  had BM  pain well controlled on Ibuprofen  DC home today

## 2019-10-26 NOTE — PROGRESS NOTE ADULT - ASSESSMENT
A/P   HD#3  POD#2 admitted for Hysterectomy   -Stable   -Continue post operative care  -: voiding spontaneously  -GI: flatus present  -DVT ppx: SCDs  -D/C home today pending attending approval.

## 2019-10-26 NOTE — PROGRESS NOTE ADULT - SUBJECTIVE AND OBJECTIVE BOX
HD#3  POD#2 admitted for Hysterectomy    S:  Patient was seen and examined at bedside. The patient is doing much better. Pain is controlled. Tolerating regular diet, denies N/V. Patient has been urinating. Patient has been ambulating. Reprots flatus.    O:   T(C): 37 (10-26-19 @ 10:26), Max: 37.1 (10-25-19 @ 15:58)  HR: 87 (10-26-19 @ 10:26) (78 - 87)  BP: 98/61 (10-26-19 @ 10:26) (98/61 - 118/74)  RR: 18 (10-26-19 @ 10:26) (16 - 18)  SpO2: 97% (10-25-19 @ 20:34) (96% - 98%)    CV: RRR  Lungs: CTABL  Abdomen: soft, nontender, + BS   Incision: dressing removed, clean dry and intact      10-25-19 @ 07:01  -  10-26-19 @ 07:00  --------------------------------------------------------  IN: 0 mL / OUT: 600 mL / NET: -600 mL      ibuprofen  Tablet. 800 milliGRAM(s) Oral every 6 hours PRN  iron sucrose IVPB 200 milliGRAM(s) IV Intermittent once  ondansetron Injectable 4 milliGRAM(s) IV Push once PRN  oxycodone    5 mG/acetaminophen 325 mG 2 Tablet(s) Oral every 6 hours PRN

## 2019-10-26 NOTE — DISCHARGE NOTE PROVIDER - NSDCCPCAREPLAN_GEN_ALL_CORE_FT
PRINCIPAL DISCHARGE DIAGNOSIS  Diagnosis: Abnormal uterine bleeding (AUB)  Assessment and Plan of Treatment:

## 2019-10-26 NOTE — DISCHARGE NOTE PROVIDER - HOSPITAL COURSE
Pt is postop day 2 after hysterectomy, the hospital course was complicated with postop anemia, pt received 2 units of pRBC and had adequate rise in hemoglobin level. Pt is being discharged in stable condition to follow up with her provider in 2 weeks.

## 2019-10-26 NOTE — DISCHARGE NOTE NURSING/CASE MANAGEMENT/SOCIAL WORK - PATIENT PORTAL LINK FT
You can access the FollowMyHealth Patient Portal offered by [facility] by registering at the following website: [email or web address]. By joining [facility]’s FollowMyHealth portal, you will also be able to view your health information using other applications (apps) compatible with our system.

## 2019-11-01 LAB — SURGICAL PATHOLOGY STUDY: SIGNIFICANT CHANGE UP

## 2020-04-13 NOTE — PATIENT PROFILE ADULT - NSPROPTRIGHTBILLOFRIGHTS_GEN_A_NUR
----- Message from Drea White sent at 4/13/2020  4:31 PM CDT -----  Contact: patient  Type:  Patient Returning Call  Who Called:  nithya  Who Left Message for Patient:  Miryam  Does the patient know what this is regarding?:  Yes to call back to get copay  Best Call Back Number:  [phone]  Additional Information:  Please callto advise.  Thanks!    
Informed pt that Miryam has left for the day. She will call her back tomorrow to take co-pay payment information. Pt verbalized understanding.   
patient